# Patient Record
Sex: MALE | Race: WHITE | URBAN - METROPOLITAN AREA
[De-identification: names, ages, dates, MRNs, and addresses within clinical notes are randomized per-mention and may not be internally consistent; named-entity substitution may affect disease eponyms.]

---

## 2020-12-04 ENCOUNTER — HISTORICAL (OUTPATIENT)
Dept: PREADMISSION TESTING | Facility: HOSPITAL | Age: 25
End: 2020-12-04

## 2020-12-04 LAB
ABS NEUT (OLG): 3.81 X10(3)/MCL (ref 2.1–9.2)
BASOPHILS # BLD AUTO: 0.1 X10(3)/MCL (ref 0–0.2)
BASOPHILS NFR BLD AUTO: 1 %
EOSINOPHIL # BLD AUTO: 0.2 X10(3)/MCL (ref 0–0.9)
EOSINOPHIL NFR BLD AUTO: 3 %
ERYTHROCYTE [DISTWIDTH] IN BLOOD BY AUTOMATED COUNT: 12.2 % (ref 11.5–17)
HCT VFR BLD AUTO: 52.3 % (ref 42–52)
HGB BLD-MCNC: 16.6 GM/DL (ref 14–18)
LYMPHOCYTES # BLD AUTO: 2 X10(3)/MCL (ref 0.6–4.6)
LYMPHOCYTES NFR BLD AUTO: 29 %
MCH RBC QN AUTO: 28.3 PG (ref 27–31)
MCHC RBC AUTO-ENTMCNC: 31.7 GM/DL (ref 33–36)
MCV RBC AUTO: 89.2 FL (ref 80–94)
MONOCYTES # BLD AUTO: 0.7 X10(3)/MCL (ref 0.1–1.3)
MONOCYTES NFR BLD AUTO: 10 %
NEUTROPHILS # BLD AUTO: 3.81 X10(3)/MCL (ref 2.1–9.2)
NEUTROPHILS NFR BLD AUTO: 56 %
PLATELET # BLD AUTO: 201 X10(3)/MCL (ref 130–400)
PMV BLD AUTO: 11.4 FL (ref 9.4–12.4)
RBC # BLD AUTO: 5.86 X10(6)/MCL (ref 4.7–6.1)
WBC # SPEC AUTO: 6.8 X10(3)/MCL (ref 4.5–11.5)

## 2020-12-09 ENCOUNTER — HISTORICAL (OUTPATIENT)
Dept: ADMINISTRATIVE | Facility: HOSPITAL | Age: 25
End: 2020-12-09

## 2022-04-30 NOTE — H&P
Patient:   Emily Almodovar             MRN: [id number]            FIN: [id number]               Age:   25 years     Sex:  Male     :  1995   Associated Diagnoses:   Bilateral inguinal hernia   Author:   Damion Vega MD      Basic Information   Admit information:  Presents for Bilateral Inguinal Hernia Repair .       Health Status   Allergies:    Allergic Reactions (Selected)  Severity Not Documented  Amoxicillin- ..   Current medications:    No qualifying data available        Histories   Past Medical History:    No active or resolved past medical history items have been selected or recorded.   Social History        Social & Psychosocial Habits    Tobacco  2015  Use: Never smoker    2020  Use: Never (less than 100 in l    Patient Wants Consult For Cessation Counseling N/A    Abuse/Neglect  2020  SHX Any signs of abuse or neglect No  .        Physical Examination   General:  Alert and oriented.    HENT:  Normocephalic.    Neck:  Supple.    Respiratory:  Lungs are clear to auscultation.    Cardiovascular:  Normal rate.    Gastrointestinal:  Normal,      Abdomen: Inguinal hernia, Reducible, Bilateral.    Genitourinary:  No costovertebral angle tenderness.    Musculoskeletal:  Normal range of motion.    Integumentary:  Warm.    Neurologic:  Alert.       Review / Management   Results review:     No qualifying data available.       Impression and Plan   Diagnosis     Bilateral inguinal hernia ([id number] K40.20).       Lap Morgan IH repair

## 2022-04-30 NOTE — OP NOTE
Patient:   Emily Almodovar             MRN: [id number]            FIN: [id number]               Age:   25 years     Sex:  Male     :  1995   Associated Diagnoses:   Bilateral inguinal hernia   Author:   Damion Vega MD      Operative Note   Operative Information   Date/ Time:  2020 22:22:00.     Procedures Performed: Laparoscopic Bilateral Inguinal Hernia Repair, TEPP.     Preoperative Diagnosis: Bilateral inguinal hernia (WDC42-QO K40.20).     Postoperative Diagnosis: Bilateral inguinal hernia (OBT90-HQ K40.20).     Surgeon: Damion Vega MD.     Assistant: Nilam Rose.     Anesthesia: GETA.     Description of Procedure/Findings/    Complications: After informed consent, the patient was brought to the OR.  He was placed on the table in the supine position, and given general anesthesia.    The abdomen was prepped and draped.  The bladder was emptied.  An infraumbilicial incision was made into the preperitoneal space.  A peritoneal dissection balloon was placed and insufflated under direct vision.  The SBT replaced this device and pneumopreperitoneum was established.  Additional working ports placed under direct vision.  He had a bilateral indirect hernia.  The contents were reduced.  Appropriate dissection revealed the appropriate structures (Ileopubic tract, Iliac Vessels, Cord structures, Savage's ligament, Inferior Epigastric Vessels) There was no injury to any of these structures.  Large  3D Max mesh placed up against the myopectineal orifice on both right and left.  This completed the repair.  Trocars removed.  Incisions closed with 0 and 4-0 Vicryl.  The patient was allowed to emerge from anesthetic and brought to recovery room.  .     Esimated blood loss: loss less than  25  cc.     Complications.